# Patient Record
Sex: MALE | Race: OTHER | HISPANIC OR LATINO | Employment: FULL TIME | URBAN - METROPOLITAN AREA
[De-identification: names, ages, dates, MRNs, and addresses within clinical notes are randomized per-mention and may not be internally consistent; named-entity substitution may affect disease eponyms.]

---

## 2024-08-16 ENCOUNTER — HOSPITAL ENCOUNTER (OUTPATIENT)
Dept: RADIOLOGY | Facility: MEDICAL CENTER | Age: 24
End: 2024-08-16

## 2024-08-16 ENCOUNTER — HOSPITAL ENCOUNTER (EMERGENCY)
Facility: MEDICAL CENTER | Age: 24
End: 2024-08-17
Attending: STUDENT IN AN ORGANIZED HEALTH CARE EDUCATION/TRAINING PROGRAM

## 2024-08-16 ENCOUNTER — APPOINTMENT (OUTPATIENT)
Dept: RADIOLOGY | Facility: MEDICAL CENTER | Age: 24
End: 2024-08-16
Attending: STUDENT IN AN ORGANIZED HEALTH CARE EDUCATION/TRAINING PROGRAM

## 2024-08-16 ENCOUNTER — APPOINTMENT (OUTPATIENT)
Dept: URGENT CARE | Facility: PHYSICIAN GROUP | Age: 24
End: 2024-08-16

## 2024-08-16 DIAGNOSIS — S66.222A LACERATION OF EXTENSOR TENDON OF LEFT THUMB AT HAND LEVEL: ICD-10-CM

## 2024-08-16 DIAGNOSIS — S61.012A LACERATION OF LEFT THUMB WITHOUT FOREIGN BODY WITHOUT DAMAGE TO NAIL, INITIAL ENCOUNTER: ICD-10-CM

## 2024-08-16 PROCEDURE — 304217 HCHG IRRIGATION SYSTEM

## 2024-08-16 PROCEDURE — 73140 X-RAY EXAM OF FINGER(S): CPT | Mod: RT

## 2024-08-16 PROCEDURE — 700101 HCHG RX REV CODE 250: Performed by: STUDENT IN AN ORGANIZED HEALTH CARE EDUCATION/TRAINING PROGRAM

## 2024-08-16 PROCEDURE — 303747 HCHG EXTRA SUTURE

## 2024-08-16 PROCEDURE — 99283 EMERGENCY DEPT VISIT LOW MDM: CPT

## 2024-08-16 PROCEDURE — 304999 HCHG REPAIR-SIMPLE/INTERMED LEVEL 1

## 2024-08-16 PROCEDURE — 307740 HCHG GREEN TRAUMA TEAM SERVICES

## 2024-08-16 RX ORDER — LIDOCAINE HYDROCHLORIDE AND EPINEPHRINE 10; 10 MG/ML; UG/ML
10 INJECTION, SOLUTION INFILTRATION; PERINEURAL ONCE
Status: COMPLETED | OUTPATIENT
Start: 2024-08-16 | End: 2024-08-16

## 2024-08-16 RX ADMIN — LIDOCAINE HYDROCHLORIDE AND EPINEPHRINE 10 ML: 10; 10 INJECTION, SOLUTION INFILTRATION; PERINEURAL at 23:00

## 2024-08-17 ENCOUNTER — PHARMACY VISIT (OUTPATIENT)
Dept: PHARMACY | Facility: MEDICAL CENTER | Age: 24
End: 2024-08-17
Payer: COMMERCIAL

## 2024-08-17 VITALS
DIASTOLIC BLOOD PRESSURE: 71 MMHG | HEIGHT: 68 IN | TEMPERATURE: 98.4 F | OXYGEN SATURATION: 97 % | HEART RATE: 58 BPM | SYSTOLIC BLOOD PRESSURE: 142 MMHG | RESPIRATION RATE: 17 BRPM | BODY MASS INDEX: 22.73 KG/M2 | WEIGHT: 150 LBS

## 2024-08-17 PROCEDURE — 304999 HCHG REPAIR-SIMPLE/INTERMED LEVEL 1

## 2024-08-17 PROCEDURE — 303747 HCHG EXTRA SUTURE

## 2024-08-17 PROCEDURE — RXMED WILLOW AMBULATORY MEDICATION CHARGE: Performed by: STUDENT IN AN ORGANIZED HEALTH CARE EDUCATION/TRAINING PROGRAM

## 2024-08-17 RX ORDER — CEPHALEXIN 500 MG/1
500 CAPSULE ORAL 3 TIMES DAILY
Qty: 15 CAPSULE | Refills: 0 | Status: ACTIVE | OUTPATIENT
Start: 2024-08-17 | End: 2024-08-22

## 2024-08-17 NOTE — DISCHARGE INSTRUCTIONS
You can take your dressing off and change it 1 time daily.    You can clean the area gently with hand soap and warm water but otherwise do not soak it.    Put the barrier back on when you change your dressing to prevent your thumb from bending.    Take your antibiotics as prescribed.    Follow-up with orthopedic surgery in 1 to 2 weeks.  You can call the attached number to schedule an appointment and they are aware of you.    Please return to the ER with any recurrent bleeding that does not resolve with direct pressure, fever, redness, swelling, drainage of pus or if you are otherwise feeling much worse.

## 2024-08-17 NOTE — ED NOTES
Pt discharged to home. Pt was given follow up instructions and prescriptions for keflex. Pt verbalized understanding of all instructions for discharge and is ambulatory out of ED with steady gait. AOx4

## 2024-08-17 NOTE — ED TRIAGE NOTES
Chief Complaint   Patient presents with    Trauma Green     Trauma Green Transfer from [facility]     22 yo male,    Deep Laceration left thumb and into joint space of his thumb  + good flexion/ limited extension at left thumb    Skill saw at work cut off a huge chunk at  accidentally injured his left thumb area at around 03:30 pm    Given Ancef 1 gm IV  2 tablets of Norco  Tatanus 0.5 mL IM given     Pain: 6/10    Pt came in to triage ambulatory with steady gait for the above complaints, then transferred to [facility]    Pt is alert and oriented x 4, speaking in full sentences, follows commands and responds appropriately to questions.     Respirations are even and unlabored.      Vitals:    08/16/24 2236   BP: (!) 144/86   Pulse: 88   Resp: 17   Temp:    SpO2: 96%      no

## 2024-08-17 NOTE — ED PROVIDER NOTES
ED Provider Note    CHIEF COMPLAINT  Chief Complaint   Patient presents with    Trauma Green     Trauma Green Transfer from [facility]     22 yo male,    Deep Laceration left thumb and into joint space of his thumb  + good flexion/ limited extension at left thumb    Skill saw at work cut off a huge chunk at  accidentally injured his left thumb area at around 03:30 pm    Given Ancef 1 gm IV  2 tablets of Norco  Tatanus 0.5 mL IM given       EXTERNAL RECORDS REVIEWED  External ED Note reviewed.  Patient received 1 g of Ancef, 2 tablets of Norco and a tetanus vaccination at [facility]    HPI/ROS  LIMITATION TO HISTORY   Select: Language Bahamian,  Used       Lucho Enriquez Yajaira Arias is a 23 y.o. male who presents to the emergency department for evaluation as a trauma green trauma green transfer from [facility] where he presented for evaluation of a circular saw injury with laceration to his dorsal right thumb.  Wound was evaluated at that facility demonstrating intra-articular involvement at the MCP joint.  Patient was transferred given lack of orthopedic consultation at that facility.  Currently patient reports pain is well-controlled.  He denies any additional injuries.  He does report some limited ability to extend the thumb.  He denies numbness in the hand.    PAST MEDICAL HISTORY   has a past medical history of Patient denies medical problems.    SURGICAL HISTORY   has a past surgical history that includes no pertinent past surgical history.    FAMILY HISTORY  History reviewed. No pertinent family history.    SOCIAL HISTORY  Social History     Tobacco Use    Smoking status: Never    Smokeless tobacco: Not on file   Vaping Use    Vaping status: Never Used   Substance and Sexual Activity    Alcohol use: Never    Drug use: Never    Sexual activity: Not on file       CURRENT MEDICATIONS  Home Medications       Reviewed by Kathy Bahena R.N. (Registered Nurse) on  "08/16/24 at 2248  Med List Status: Partial     Medication Last Dose Status        Patient Magdi Taking any Medications                         Audit from Redirected Encounters    **Home medications have not yet been reviewed for this encounter**         ALLERGIES  Not on File    PHYSICAL EXAM  VITAL SIGNS: BP (!) 144/86   Pulse 88   Temp 37.1 °C (98.7 °F)   Resp 17   Ht 1.727 m (5' 8\")   Wt 68 kg (150 lb)   SpO2 96%   BMI 22.81 kg/m²    Constitutional: No acute distress, pleasant sitting upright in the [facility] well-appearing  HEENT: Atraumatic, normocephalic, pupils are equal round reactive to light, nose normal, mouth shows moist mucous membranes  Neck: Supple, no JVD, no tracheal deviation  Cardiovascular: Regular rate and rhythm, no murmur, rub or gallop, 2+ radial pulses bilaterally, capillary fill less than 2 seconds to both radial and ulnar aspect of the left thumb  Thorax & Lungs: No respiratory distress, clear breath sounds  GI: Soft, non-distended, non-tender  Skin: Warm, dry, deep gaping laceration to the dorsum of the left thumb overlying the MCP joint with exposed tendon apparent.  Musculoskeletal: Deep laceration limiting mobility of the left thumb.  No obvious bony deformity appreciated though concern for such given depth of wound.  Normal flexion ability.  Limited extension of the left thumb.  Neurologic: A&Ox3, at baseline mentation, normal distal sensation to radial and ulnar aspect of the left thumb.   Psychiatric: Appropriate affect for situation at this time    Laceration Repair Procedure Note    Indication: Laceration    Procedure: The patient was placed in the appropriate position and anesthesia around the laceration was obtained by infiltration using 1% Lidocaine with epinephrine. The wound was highly contaminated .The area was then irrigated with high pressure normal saline.  Laceration was explored and appears to probe into the MCP joint with a partial extensor tendon tear.  The " laceration was closed with 4-0 Ethilon using interrupted sutures. There were no additional lacerations requiring repair. The wound area was then dressed with bacitracin, a sterile dressing, and a bandage.      Total repaired wound length: 4 cm.     Other Items: Suture count: 6    The patient tolerated the procedure well.    Complications: None      RADIOLOGY/PROCEDURES   I have independently interpreted the diagnostic imaging associated with this visit and am waiting the final reading from the radiologist.   My preliminary interpretation is as follows: No bony fracture appreciated.    Radiologist interpretation:  DX-FINGER(S) 2+ RIGHT   Final Result         1.  No acute traumatic bony injury identified.   2.  Probable laceration of the thumb at the metacarpal phalangeal joint with punctate radiodensity suggesting soft tissue foreign bodies.          COURSE & MEDICAL DECISION MAKING    ASSESSMENT, COURSE AND PLAN  Care Narrative:     Patient rides to the emergency department as a trauma green trauma transfer given a laceration to his left hand sustained via a circular saw.  No additional injuries appreciated with intact primary survey on arrival, no active hemorrhage with bleeding well-controlled.  Examination demonstrating a deep irregular laceration with extensive tissue loss to the dorsum of the left thumb base overlying the MCP joint which appears to extend into the intra-articular space.  Extensor tendon appreciated and appears partially lacerated.  Patient already received appropriate antibiotics, Tdap vaccination at transferring facility.  Pain well-controlled currently.  Will obtain x-ray to screen for underlying bony abnormality.  Will discuss with orthopedic surgery.    X-ray with no underlying bony fracture.  Case discussed with on-call orthopedic surgeon Dr. Garnica including concern for intra-articular extension and extensor tendon injury.  Recommends copious irrigation, loose closure with sutures,  splinting and prompt clinic follow-up with a plan for definitive surgical management in the next 1 to 2 weeks.  Agrees with plan for antibiotic prophylaxis.  Does not feel patient necessitates admission for operative washout.  As above patient already received Ancef.  Will discharge on Keflex.  Wound was repaired as above and wound care instructions discussed with the patient/splint care instructions discussed as well.  Referral was placed and follow-up information provided.  Patient very comfortable with this plan, pain well-controlled.  He was discharged stable condition.    ADDITIONAL PROBLEMS MANAGED  None    DISPOSITION AND DISCUSSIONS  I have discussed management of the patient with the following physicians and MACARENA's: Dr. Garnica, orthopedic surgery    Discussion of management with other [facility] or appropriate source(s): None     Escalation of care considered, and ultimately not performed:acute inpatient care management, however at this time, the patient is most appropriate for outpatient management    FINAL DIAGNOSIS  1. Laceration of left thumb without foreign body without damage to nail, initial encounter    2. Laceration of extensor tendon of left thumb at hand level         Electronically signed by: Jones Tolbert M.D., 8/16/2024 10:39 PM

## 2024-08-17 NOTE — ED NOTES
Trauma Green Transfer from [facility]     22 yo male,    Deep Laceration left thumb and into joint space of his thumb  + good flexion/ limited extension at left thumb    Skill saw at work cut off a huge chunk at around 03:30 pm    Given Ancef 1 gm IV  2 tablets of Norco  Tatanus 0.5 mL IM given